# Patient Record
Sex: MALE | Race: WHITE | NOT HISPANIC OR LATINO | URBAN - METROPOLITAN AREA
[De-identification: names, ages, dates, MRNs, and addresses within clinical notes are randomized per-mention and may not be internally consistent; named-entity substitution may affect disease eponyms.]

---
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
## 2023-08-08 ENCOUNTER — APPOINTMENT (RX ONLY)
Dept: URBAN - METROPOLITAN AREA CLINIC 108 | Facility: CLINIC | Age: 18
Setting detail: DERMATOLOGY
End: 2023-08-08

## 2023-08-08 DIAGNOSIS — L70.0 ACNE VULGARIS: ICD-10-CM

## 2023-08-08 PROCEDURE — ? COUNSELING

## 2023-08-08 PROCEDURE — 99213 OFFICE O/P EST LOW 20 MIN: CPT

## 2023-08-08 PROCEDURE — ? TREATMENT REGIMEN

## 2023-08-08 PROCEDURE — ? PRESCRIPTION

## 2023-08-08 RX ORDER — TAZAROTENE 1 MG/G
AEROSOL, FOAM TOPICAL
Qty: 50 | Refills: 2 | Status: ERX | COMMUNITY
Start: 2023-08-08

## 2023-08-08 RX ADMIN — TAZAROTENE 1: 1 AEROSOL, FOAM TOPICAL at 00:00
